# Patient Record
Sex: FEMALE | Race: BLACK OR AFRICAN AMERICAN | NOT HISPANIC OR LATINO
[De-identification: names, ages, dates, MRNs, and addresses within clinical notes are randomized per-mention and may not be internally consistent; named-entity substitution may affect disease eponyms.]

---

## 2022-03-21 PROBLEM — Z00.00 ENCOUNTER FOR PREVENTIVE HEALTH EXAMINATION: Status: ACTIVE | Noted: 2022-03-21

## 2022-03-30 ENCOUNTER — APPOINTMENT (OUTPATIENT)
Dept: ENDOCRINOLOGY | Facility: CLINIC | Age: 57
End: 2022-03-30
Payer: MEDICAID

## 2022-03-30 VITALS
SYSTOLIC BLOOD PRESSURE: 169 MMHG | HEART RATE: 64 BPM | HEIGHT: 64 IN | DIASTOLIC BLOOD PRESSURE: 86 MMHG | BODY MASS INDEX: 42.34 KG/M2 | WEIGHT: 248 LBS

## 2022-03-30 PROCEDURE — 99214 OFFICE O/P EST MOD 30 MIN: CPT

## 2022-03-31 NOTE — REVIEW OF SYSTEMS
[Negative] : Heme/Lymph [Fatigue] : fatigue [As Noted in HPI] : as noted in HPI [Palpitations] : palpitations [Recent Weight Loss (___ Lbs)] : no recent weight loss [Dysphagia] : no dysphagia [Anxiety] : no anxiety

## 2022-03-31 NOTE — HISTORY OF PRESENT ILLNESS
[FreeTextEntry1] : 56 year y/o F pt, with Hx of Graves disease (dx. ~ 7 yrs ago), presents today to establish endocrine care with me. \par Other PMHx: HTN. No PMHx of:\par FHx: Mother: HTN. Father: Siblings: No FHx of: Thyroid CA, Thyroid disorders, MI. \par SHx: Never smoker. No EtOH use. \par LMP: 2021\par NKDA\par \par 03/30/2022\par Pt presents today for thyroid evaluation because she needs follow up for her thyroid. She was not able to schedule an appointment with her previous endocrinologist. She was diagnosed with thyroid disorder ~7 yrs ago, after completing imaging studies and biopsies. She saw the endocrinologist twice and her PCP managed her thyroid. Last endocrinologist visit was in 2020. \par She was taking Methimazole 5 mg every other day starting 2019 until 2021. \par \par Pt is feeling occasionally tired and c/o occasional palpitations. \par Denies diarrhea, anxiety, dysphagia, or weight loss. \par \par Current Medications: Losartan 100-25 mg qd, Vit D. \par - Methimazole 5 mg every other day (held 2021)

## 2022-03-31 NOTE — END OF VISIT
[FreeTextEntry3] : All medical record entries made by the Scribe were at my, Dr. Dennis Puente, direction and personally dictated by me on 03/30/2022. I have reviewed the chart and agree that the record accurately reflects my personal performance of the history, physical exam, assessment and plan. I have also personally directed, reviewed and agreed with the chart.  [Time Spent: ___ minutes] : I have spent [unfilled] minutes of time on the encounter.

## 2022-03-31 NOTE — ASSESSMENT
[FreeTextEntry1] : 56 year y/o F pt with:\par \par 1. History of Graves disease diagnosed ~ 7 yrs ago:\par Pt has been mostly managed by her PCP for Graves disease and her last endocrinologist was in 2020 (she saw her endocrinologist twice). \par She was on Methimazole 5 mg every other day for 2 yrs ([id number]). \par Pt appears to be clinically euthyroid. There are no clinical evidence of any other autoimmune endocrine disorders. \par Thyroid gland appears enlarged, measuring ~ 50 g. No bruit or ophthalmopathy. \par Overview on Graves disease was given. \par Send labs. \par \par Return in: 2 months.

## 2022-03-31 NOTE — ADDENDUM
[FreeTextEntry1] : I Aleyx Adi act soley as a scribe for Dr. Dennis Puente on this date. 03/30/2022

## 2022-03-31 NOTE — PHYSICAL EXAM
[Alert] : alert [Normal Sclera/Conjunctiva] : normal sclera/conjunctiva [Normal Outer Ear/Nose] : the ears and nose were normal in appearance [No Respiratory Distress] : no respiratory distress [Normal Rate] : heart rate was normal [Regular Rhythm] : with a regular rhythm [No Edema] : no peripheral edema [Soft] : abdomen soft [Spine Straight] : spine straight [No Stigmata of Cushings Syndrome] : no stigmata of Cushings Syndrome [Normal Gait] : normal gait [No Rash] : no rash [Normal Reflexes] : deep tendon reflexes were 2+ and symmetric [Oriented x3] : oriented to person, place, and time [No Proptosis] : no proptosis [de-identified] : Periorbital edema.  [de-identified] : Enlarged glands b/l, measuring ~60g. Regular borders, no tenderness.

## 2022-04-01 LAB
25(OH)D3 SERPL-MCNC: 24.5 NG/ML
ALBUMIN SERPL ELPH-MCNC: 4.2 G/DL
ALP BLD-CCNC: 77 U/L
ALT SERPL-CCNC: 19 U/L
ANION GAP SERPL CALC-SCNC: 11 MMOL/L
AST SERPL-CCNC: 22 U/L
BASOPHILS # BLD AUTO: 0.04 K/UL
BASOPHILS NFR BLD AUTO: 0.7 %
BILIRUB SERPL-MCNC: 0.3 MG/DL
BUN SERPL-MCNC: 16 MG/DL
CALCIUM SERPL-MCNC: 9.3 MG/DL
CHLORIDE SERPL-SCNC: 104 MMOL/L
CO2 SERPL-SCNC: 26 MMOL/L
CREAT SERPL-MCNC: 0.78 MG/DL
EGFR: 90 ML/MIN/1.73M2
EOSINOPHIL # BLD AUTO: 0.08 K/UL
EOSINOPHIL NFR BLD AUTO: 1.4 %
GLUCOSE SERPL-MCNC: 99 MG/DL
HCT VFR BLD CALC: 39.8 %
HGB BLD-MCNC: 12.1 G/DL
IMM GRANULOCYTES NFR BLD AUTO: 0 %
LYMPHOCYTES # BLD AUTO: 2.95 K/UL
LYMPHOCYTES NFR BLD AUTO: 52.3 %
MAN DIFF?: NORMAL
MCHC RBC-ENTMCNC: 27.6 PG
MCHC RBC-ENTMCNC: 30.4 GM/DL
MCV RBC AUTO: 90.9 FL
MONOCYTES # BLD AUTO: 0.52 K/UL
MONOCYTES NFR BLD AUTO: 9.2 %
NEUTROPHILS # BLD AUTO: 2.05 K/UL
NEUTROPHILS NFR BLD AUTO: 36.4 %
PLATELET # BLD AUTO: 218 K/UL
POTASSIUM SERPL-SCNC: 4.6 MMOL/L
PROT SERPL-MCNC: 7.3 G/DL
RBC # BLD: 4.38 M/UL
RBC # FLD: 13.8 %
SODIUM SERPL-SCNC: 141 MMOL/L
T3FREE SERPL-MCNC: 3.72 PG/ML
T4 FREE SERPL-MCNC: 1.3 NG/DL
TSH SERPL-ACNC: 0.34 UIU/ML
WBC # FLD AUTO: 5.64 K/UL

## 2022-04-21 ENCOUNTER — NON-APPOINTMENT (OUTPATIENT)
Age: 57
End: 2022-04-21

## 2022-04-21 DIAGNOSIS — E55.9 VITAMIN D DEFICIENCY, UNSPECIFIED: ICD-10-CM

## 2022-04-21 RX ORDER — METHIMAZOLE 5 MG/1
5 TABLET ORAL EVERY OTHER DAY
Qty: 45 | Refills: 1 | Status: ACTIVE | COMMUNITY
Start: 2022-04-21 | End: 1900-01-01

## 2022-04-21 RX ORDER — MULTIVIT-MIN/FOLIC/VIT K/LYCOP 400-300MCG
50 MCG TABLET ORAL
Qty: 90 | Refills: 1 | Status: ACTIVE | COMMUNITY
Start: 2022-04-21 | End: 1900-01-01

## 2022-05-11 LAB — TSH RECEPTOR AB: 4.11 IU/L

## 2022-05-26 ENCOUNTER — APPOINTMENT (OUTPATIENT)
Dept: ENDOCRINOLOGY | Facility: CLINIC | Age: 57
End: 2022-05-26
Payer: MEDICAID

## 2022-05-26 VITALS
DIASTOLIC BLOOD PRESSURE: 78 MMHG | SYSTOLIC BLOOD PRESSURE: 148 MMHG | BODY MASS INDEX: 42.05 KG/M2 | WEIGHT: 245 LBS | HEART RATE: 69 BPM

## 2022-05-26 DIAGNOSIS — E05.00 THYROTOXICOSIS WITH DIFFUSE GOITER W/OUT THYROTOXIC CRISIS OR STORM: ICD-10-CM

## 2022-05-26 PROCEDURE — 99214 OFFICE O/P EST MOD 30 MIN: CPT

## 2022-05-27 NOTE — REVIEW OF SYSTEMS
[Negative] : Cardiovascular [Recent Weight Loss (___ Lbs)] : no recent weight loss [Dysphagia] : no dysphagia [Dysphonia] : no dysphonia [Difficulty Breathing] : no dyspnea [Anxiety] : no anxiety

## 2022-05-27 NOTE — END OF VISIT
[FreeTextEntry3] : All medical record entries made by the Scribe were at my, Dr. Dennis Puente, direction and personally dictated by me on 05/26/2022. I have reviewed the chart and agree that the record accurately reflects my personal performance of the history, physical exam, assessment and plan. I have also personally directed, reviewed and agreed with the chart.  [Time Spent: ___ minutes] : I have spent [unfilled] minutes of time on the encounter.

## 2022-05-27 NOTE — HISTORY OF PRESENT ILLNESS
[FreeTextEntry1] : 56 year y/o F pt, with Hx of Graves disease (dx. ~ 2015), presents today to establish endocrine care with me. She was taking Methimazole 5 mg every other day starting 2019 until 2021. \par Other PMHx: HTN.\par FHx: Mother: HTN. Father: Siblings: No FHx of: Thyroid CA, Thyroid disorders, MI. \par SHx: Never smoker. No EtOH use. \par LMP: 2021\par NKDA\par \par 03/30/2022\par Pt presents today for thyroid evaluation because she needs follow up for her thyroid. She was not able to schedule an appointment with her previous endocrinologist. She was diagnosed with thyroid disorder ~7 yrs ago, after completing imaging studies and biopsies. She saw the endocrinologist twice and her PCP managed her thyroid. Last endocrinologist visit was in 2020. \par She was taking Methimazole 5 mg every other day starting 2019 until 2021. \par \par Pt is feeling occasionally tired and c/o occasional palpitations. \par Denies diarrhea, anxiety, dysphagia, or weight loss. \par \par 05/26/2022\par Today, pt feels fine with no physical complaints. \par She is on Methimazole 5 mg qod. Denies respiratory obstructive symptoms. \par \par Current Medications: Methimazole 5 mg qod (held 2021; restarted 04/21/22), Losartan 100-25 mg qd, Vit D. \par

## 2022-05-27 NOTE — ADDENDUM
[FreeTextEntry1] : I Giannaheladio Joshi act soley as a scribe for Dr. Dennis Puente on this date. 05/26/2022

## 2022-05-27 NOTE — PHYSICAL EXAM
[Alert] : alert [Normal Sclera/Conjunctiva] : normal sclera/conjunctiva [No Proptosis] : no proptosis [Normal Outer Ear/Nose] : the ears and nose were normal in appearance [No Respiratory Distress] : no respiratory distress [Normal Rate] : heart rate was normal [Regular Rhythm] : with a regular rhythm [No Edema] : no peripheral edema [Soft] : abdomen soft [Spine Straight] : spine straight [No Stigmata of Cushings Syndrome] : no stigmata of Cushings Syndrome [Normal Gait] : normal gait [Normal Reflexes] : deep tendon reflexes were 2+ and symmetric [Oriented x3] : oriented to person, place, and time [Acanthosis Nigricans] : no acanthosis nigricans [de-identified] : Periorbital edema.  [de-identified] : Enlarged glands b/l, measuring ~60g. Regular borders, no tenderness.

## 2022-05-27 NOTE — ASSESSMENT
[Methimazole Therapy/PTU Therapy] : Risks and benefits of methimazole therapy/PTU therapy were discussed with the patient, including rash, liver dysfunction, and agranulocytosis. Patient was instructed to call the office for flu-like symptoms (e.g. fever and sore-throat) [FreeTextEntry1] : 56 year y/o F pt with:\par \par 1. Graves disease diagnosed ~ 7 yrs ago:\par Pt has been on Methimazole qod from 2019 and self discontinued in 2021.\par Methimazole 5 mg qod was prescribed again 04/2022. \par Pt is clinically euthyroid. She has no obstructive respiratory symptoms. \par Glands continue to be enlarged and appears nodular. \par Plan is to order Thyroid US, iodine uptake and scan, and continue conversation with pt regarding thyroid ablation treatment. \par Pt has appointment to see ophthalmologist next week. Recommend to avoid first and second hand smoking.\par \par Return in: 3 months

## 2022-05-27 NOTE — DATA REVIEWED
[FreeTextEntry1] : Labs:\par - 03/30/22: s.creat 0.78, Free T4 1.3, Free T3 3.72, TSH 0.34, TSH rAb 4.11 (H), Vit D 25-OH 24.5 (L)

## 2022-08-29 ENCOUNTER — APPOINTMENT (OUTPATIENT)
Dept: ENDOCRINOLOGY | Facility: CLINIC | Age: 57
End: 2022-08-29

## 2024-01-24 ENCOUNTER — EMERGENCY (EMERGENCY)
Facility: HOSPITAL | Age: 59
LOS: 1 days | Discharge: HOME | End: 2024-01-24
Admitting: EMERGENCY MEDICINE
Payer: MEDICAID

## 2024-01-24 VITALS
DIASTOLIC BLOOD PRESSURE: 86 MMHG | HEART RATE: 80 BPM | OXYGEN SATURATION: 95 % | SYSTOLIC BLOOD PRESSURE: 175 MMHG | RESPIRATION RATE: 18 BRPM | TEMPERATURE: 99 F

## 2024-01-24 VITALS
TEMPERATURE: 99 F | HEART RATE: 88 BPM | SYSTOLIC BLOOD PRESSURE: 177 MMHG | DIASTOLIC BLOOD PRESSURE: 104 MMHG | OXYGEN SATURATION: 96 % | WEIGHT: 199.96 LBS | HEIGHT: 64 IN | RESPIRATION RATE: 16 BRPM

## 2024-01-24 DIAGNOSIS — J11.1 INFLUENZA DUE TO UNIDENTIFIED INFLUENZA VIRUS WITH OTHER RESPIRATORY MANIFESTATIONS: ICD-10-CM

## 2024-01-24 DIAGNOSIS — Z20.822 CONTACT WITH AND (SUSPECTED) EXPOSURE TO COVID-19: ICD-10-CM

## 2024-01-24 DIAGNOSIS — R05.9 COUGH, UNSPECIFIED: ICD-10-CM

## 2024-01-24 DIAGNOSIS — E03.9 HYPOTHYROIDISM, UNSPECIFIED: ICD-10-CM

## 2024-01-24 DIAGNOSIS — I10 ESSENTIAL (PRIMARY) HYPERTENSION: ICD-10-CM

## 2024-01-24 LAB
FLUAV AG NPH QL: DETECTED
FLUBV AG NPH QL: SIGNIFICANT CHANGE UP
RSV RNA NPH QL NAA+NON-PROBE: SIGNIFICANT CHANGE UP
SARS-COV-2 RNA SPEC QL NAA+PROBE: SIGNIFICANT CHANGE UP

## 2024-01-24 PROCEDURE — 99284 EMERGENCY DEPT VISIT MOD MDM: CPT

## 2024-01-24 RX ORDER — ACETAMINOPHEN 500 MG
650 TABLET ORAL ONCE
Refills: 0 | Status: COMPLETED | OUTPATIENT
Start: 2024-01-24 | End: 2024-01-24

## 2024-01-24 RX ORDER — IBUPROFEN 200 MG
600 TABLET ORAL ONCE
Refills: 0 | Status: COMPLETED | OUTPATIENT
Start: 2024-01-24 | End: 2024-01-24

## 2024-01-24 RX ADMIN — Medication 650 MILLIGRAM(S): at 11:39

## 2024-01-24 RX ADMIN — Medication 600 MILLIGRAM(S): at 11:39

## 2024-01-24 NOTE — ED PROVIDER NOTE - CLINICAL SUMMARY MEDICAL DECISION MAKING FREE TEXT BOX
Patient here with coryza symptoms since this morning tested positive for influenza.  Exam unremarkable.  Will discharge with return precautions and infectious precautions.

## 2024-01-24 NOTE — ED PROVIDER NOTE - PATIENT PORTAL LINK FT
You can access the FollowMyHealth Patient Portal offered by [facility] by registering at the following website: [email or web address]. By joining [facility]’s FollowMyHealth portal, you will also be able to view your health information using other applications (apps) compatible with our system.

## 2024-01-24 NOTE — ED PROVIDER NOTE - OBJECTIVE STATEMENT
58-year-old female history of hypertension and hypothyroidism now coming in for cough and congestion since this morning.  Patient endorses body aches.  Denies nausea, vomiting, fevers, chills, chest pain, shortness of breath.  Patient appears well asking for work note